# Patient Record
Sex: MALE | Race: WHITE | Employment: UNEMPLOYED | URBAN - METROPOLITAN AREA
[De-identification: names, ages, dates, MRNs, and addresses within clinical notes are randomized per-mention and may not be internally consistent; named-entity substitution may affect disease eponyms.]

---

## 2018-05-03 PROBLEM — G81.90 HEMIPLEGIA (HCC): Status: ACTIVE | Noted: 2018-05-03

## 2018-05-03 PROBLEM — R56.9 SEIZURE (HCC): Status: ACTIVE | Noted: 2018-05-03

## 2018-05-03 PROBLEM — A41.9 SEPSIS (HCC): Status: ACTIVE | Noted: 2018-05-03

## 2018-05-03 PROBLEM — Z89.519 HX OF BKA (HCC): Status: ACTIVE | Noted: 2018-05-03

## 2018-05-03 PROBLEM — I82.409 DVT (DEEP VENOUS THROMBOSIS) (HCC): Status: ACTIVE | Noted: 2018-05-03

## 2018-05-03 PROBLEM — I63.9 CVA (CEREBRAL VASCULAR ACCIDENT) (HCC): Status: ACTIVE | Noted: 2018-05-03

## 2018-05-07 PROBLEM — N39.0 UTI (URINARY TRACT INFECTION): Status: ACTIVE | Noted: 2018-05-07

## 2018-06-06 PROBLEM — N39.0 UTI (URINARY TRACT INFECTION): Status: RESOLVED | Noted: 2018-05-07 | Resolved: 2018-06-06

## 2025-01-20 ENCOUNTER — ANESTHESIA EVENT (OUTPATIENT)
Dept: OPERATING ROOM | Age: 65
End: 2025-01-20
Payer: MEDICARE

## 2025-01-20 ENCOUNTER — HOSPITAL ENCOUNTER (OUTPATIENT)
Age: 65
Setting detail: OUTPATIENT SURGERY
Discharge: HOME | End: 2025-01-20
Attending: UROLOGY | Admitting: UROLOGY
Payer: MEDICARE

## 2025-01-20 ENCOUNTER — APPOINTMENT (OUTPATIENT)
Dept: GENERAL RADIOLOGY | Age: 65
End: 2025-01-20
Attending: UROLOGY
Payer: MEDICARE

## 2025-01-20 ENCOUNTER — ANESTHESIA (OUTPATIENT)
Dept: OPERATING ROOM | Age: 65
End: 2025-01-20
Payer: MEDICARE

## 2025-01-20 VITALS
HEART RATE: 59 BPM | RESPIRATION RATE: 15 BRPM | SYSTOLIC BLOOD PRESSURE: 110 MMHG | OXYGEN SATURATION: 95 % | HEIGHT: 67 IN | DIASTOLIC BLOOD PRESSURE: 67 MMHG | WEIGHT: 190 LBS | TEMPERATURE: 97.7 F | BODY MASS INDEX: 29.82 KG/M2

## 2025-01-20 DIAGNOSIS — N20.0 CALCULUS OF KIDNEY: ICD-10-CM

## 2025-01-20 LAB
APTT BLD: 23.9 SEC (ref 22.1–36.4)
INR PPP: 0.99 (ref 0.85–1.15)
PROTHROMBIN TIME: 13.3 SEC (ref 11.9–14.9)

## 2025-01-20 PROCEDURE — 3600000004 HC SURGERY LEVEL 4 BASE: Performed by: UROLOGY

## 2025-01-20 PROCEDURE — 2580000003 HC RX 258: Performed by: NURSE ANESTHETIST, CERTIFIED REGISTERED

## 2025-01-20 PROCEDURE — 7100000010 HC PHASE II RECOVERY - FIRST 15 MIN: Performed by: UROLOGY

## 2025-01-20 PROCEDURE — 2500000003 HC RX 250 WO HCPCS: Performed by: UROLOGY

## 2025-01-20 PROCEDURE — 6360000002 HC RX W HCPCS: Performed by: NURSE ANESTHETIST, CERTIFIED REGISTERED

## 2025-01-20 PROCEDURE — 3600000014 HC SURGERY LEVEL 4 ADDTL 15MIN: Performed by: UROLOGY

## 2025-01-20 PROCEDURE — 3700000001 HC ADD 15 MINUTES (ANESTHESIA): Performed by: UROLOGY

## 2025-01-20 PROCEDURE — C1894 INTRO/SHEATH, NON-LASER: HCPCS | Performed by: UROLOGY

## 2025-01-20 PROCEDURE — 2580000003 HC RX 258: Performed by: ANESTHESIOLOGY

## 2025-01-20 PROCEDURE — C2617 STENT, NON-COR, TEM W/O DEL: HCPCS | Performed by: UROLOGY

## 2025-01-20 PROCEDURE — 6360000002 HC RX W HCPCS: Performed by: UROLOGY

## 2025-01-20 PROCEDURE — 88300 SURGICAL PATH GROSS: CPT

## 2025-01-20 PROCEDURE — 85610 PROTHROMBIN TIME: CPT

## 2025-01-20 PROCEDURE — 85730 THROMBOPLASTIN TIME PARTIAL: CPT

## 2025-01-20 PROCEDURE — C1747 HC ENDOSCOPE, SINGLE, URINARY TRACT: HCPCS | Performed by: UROLOGY

## 2025-01-20 PROCEDURE — 74420 UROGRAPHY RTRGR +-KUB: CPT

## 2025-01-20 PROCEDURE — 7100000000 HC PACU RECOVERY - FIRST 15 MIN: Performed by: UROLOGY

## 2025-01-20 PROCEDURE — 7100000011 HC PHASE II RECOVERY - ADDTL 15 MIN: Performed by: UROLOGY

## 2025-01-20 PROCEDURE — C1758 CATHETER, URETERAL: HCPCS | Performed by: UROLOGY

## 2025-01-20 PROCEDURE — 2709999900 HC NON-CHARGEABLE SUPPLY: Performed by: UROLOGY

## 2025-01-20 PROCEDURE — 2500000003 HC RX 250 WO HCPCS: Performed by: NURSE ANESTHETIST, CERTIFIED REGISTERED

## 2025-01-20 PROCEDURE — 7100000001 HC PACU RECOVERY - ADDTL 15 MIN: Performed by: UROLOGY

## 2025-01-20 PROCEDURE — C1769 GUIDE WIRE: HCPCS | Performed by: UROLOGY

## 2025-01-20 PROCEDURE — 3700000000 HC ANESTHESIA ATTENDED CARE: Performed by: UROLOGY

## 2025-01-20 PROCEDURE — 82365 CALCULUS SPECTROSCOPY: CPT

## 2025-01-20 PROCEDURE — 6360000004 HC RX CONTRAST MEDICATION: Performed by: UROLOGY

## 2025-01-20 DEVICE — URETERAL STENT
Type: IMPLANTABLE DEVICE | Site: URETER | Status: FUNCTIONAL
Brand: POLARIS™ ULTRA

## 2025-01-20 RX ORDER — FERROUS SULFATE 325(65) MG
325 TABLET ORAL
COMMUNITY

## 2025-01-20 RX ORDER — ROCURONIUM BROMIDE 10 MG/ML
INJECTION, SOLUTION INTRAVENOUS
Status: DISCONTINUED | OUTPATIENT
Start: 2025-01-20 | End: 2025-01-20 | Stop reason: SDUPTHER

## 2025-01-20 RX ORDER — NALOXONE HYDROCHLORIDE 0.4 MG/ML
INJECTION, SOLUTION INTRAMUSCULAR; INTRAVENOUS; SUBCUTANEOUS PRN
Status: DISCONTINUED | OUTPATIENT
Start: 2025-01-20 | End: 2025-01-20 | Stop reason: HOSPADM

## 2025-01-20 RX ORDER — LIDOCAINE HYDROCHLORIDE 20 MG/ML
INJECTION, SOLUTION INTRAVENOUS
Status: DISCONTINUED | OUTPATIENT
Start: 2025-01-20 | End: 2025-01-20 | Stop reason: SDUPTHER

## 2025-01-20 RX ORDER — METOCLOPRAMIDE HYDROCHLORIDE 5 MG/ML
10 INJECTION INTRAMUSCULAR; INTRAVENOUS
Status: DISCONTINUED | OUTPATIENT
Start: 2025-01-20 | End: 2025-01-20 | Stop reason: HOSPADM

## 2025-01-20 RX ORDER — FENTANYL CITRATE 50 UG/ML
INJECTION, SOLUTION INTRAMUSCULAR; INTRAVENOUS
Status: DISCONTINUED | OUTPATIENT
Start: 2025-01-20 | End: 2025-01-20 | Stop reason: SDUPTHER

## 2025-01-20 RX ORDER — DIPHENHYDRAMINE HYDROCHLORIDE 50 MG/ML
12.5 INJECTION INTRAMUSCULAR; INTRAVENOUS
Status: DISCONTINUED | OUTPATIENT
Start: 2025-01-20 | End: 2025-01-20 | Stop reason: HOSPADM

## 2025-01-20 RX ORDER — SODIUM CHLORIDE 9 MG/ML
INJECTION, SOLUTION INTRAVENOUS PRN
Status: DISCONTINUED | OUTPATIENT
Start: 2025-01-20 | End: 2025-01-20 | Stop reason: HOSPADM

## 2025-01-20 RX ORDER — MEPERIDINE HYDROCHLORIDE 25 MG/ML
12.5 INJECTION INTRAMUSCULAR; INTRAVENOUS; SUBCUTANEOUS EVERY 5 MIN PRN
Status: DISCONTINUED | OUTPATIENT
Start: 2025-01-20 | End: 2025-01-20 | Stop reason: HOSPADM

## 2025-01-20 RX ORDER — HYDRALAZINE HYDROCHLORIDE 20 MG/ML
10 INJECTION INTRAMUSCULAR; INTRAVENOUS
Status: DISCONTINUED | OUTPATIENT
Start: 2025-01-20 | End: 2025-01-20 | Stop reason: HOSPADM

## 2025-01-20 RX ORDER — PROPOFOL 10 MG/ML
INJECTION, EMULSION INTRAVENOUS
Status: DISCONTINUED | OUTPATIENT
Start: 2025-01-20 | End: 2025-01-20 | Stop reason: SDUPTHER

## 2025-01-20 RX ORDER — SODIUM CHLORIDE 0.9 % (FLUSH) 0.9 %
5-40 SYRINGE (ML) INJECTION PRN
Status: DISCONTINUED | OUTPATIENT
Start: 2025-01-20 | End: 2025-01-20 | Stop reason: HOSPADM

## 2025-01-20 RX ORDER — HYDROMORPHONE HYDROCHLORIDE 1 MG/ML
0.5 INJECTION, SOLUTION INTRAMUSCULAR; INTRAVENOUS; SUBCUTANEOUS EVERY 10 MIN PRN
Status: DISCONTINUED | OUTPATIENT
Start: 2025-01-20 | End: 2025-01-20 | Stop reason: HOSPADM

## 2025-01-20 RX ORDER — SODIUM CHLORIDE 0.9 % (FLUSH) 0.9 %
5-40 SYRINGE (ML) INJECTION EVERY 12 HOURS SCHEDULED
Status: DISCONTINUED | OUTPATIENT
Start: 2025-01-20 | End: 2025-01-20 | Stop reason: HOSPADM

## 2025-01-20 RX ORDER — MIDAZOLAM HYDROCHLORIDE 1 MG/ML
INJECTION, SOLUTION INTRAMUSCULAR; INTRAVENOUS
Status: DISCONTINUED | OUTPATIENT
Start: 2025-01-20 | End: 2025-01-20 | Stop reason: SDUPTHER

## 2025-01-20 RX ORDER — MAGNESIUM HYDROXIDE 1200 MG/15ML
LIQUID ORAL CONTINUOUS PRN
Status: DISCONTINUED | OUTPATIENT
Start: 2025-01-20 | End: 2025-01-20 | Stop reason: HOSPADM

## 2025-01-20 RX ORDER — LABETALOL HYDROCHLORIDE 5 MG/ML
10 INJECTION, SOLUTION INTRAVENOUS
Status: DISCONTINUED | OUTPATIENT
Start: 2025-01-20 | End: 2025-01-20 | Stop reason: HOSPADM

## 2025-01-20 RX ORDER — HYDROMORPHONE HYDROCHLORIDE 1 MG/ML
0.5 INJECTION, SOLUTION INTRAMUSCULAR; INTRAVENOUS; SUBCUTANEOUS EVERY 5 MIN PRN
Status: DISCONTINUED | OUTPATIENT
Start: 2025-01-20 | End: 2025-01-20 | Stop reason: HOSPADM

## 2025-01-20 RX ORDER — IOPAMIDOL 612 MG/ML
INJECTION, SOLUTION INTRAVASCULAR PRN
Status: DISCONTINUED | OUTPATIENT
Start: 2025-01-20 | End: 2025-01-20 | Stop reason: HOSPADM

## 2025-01-20 RX ORDER — ONDANSETRON 2 MG/ML
INJECTION INTRAMUSCULAR; INTRAVENOUS
Status: DISCONTINUED | OUTPATIENT
Start: 2025-01-20 | End: 2025-01-20 | Stop reason: SDUPTHER

## 2025-01-20 RX ORDER — SODIUM CHLORIDE, SODIUM LACTATE, POTASSIUM CHLORIDE, CALCIUM CHLORIDE 600; 310; 30; 20 MG/100ML; MG/100ML; MG/100ML; MG/100ML
INJECTION, SOLUTION INTRAVENOUS
Status: DISCONTINUED | OUTPATIENT
Start: 2025-01-20 | End: 2025-01-20 | Stop reason: SDUPTHER

## 2025-01-20 RX ORDER — CIPROFLOXACIN 2 MG/ML
400 INJECTION, SOLUTION INTRAVENOUS ONCE
Status: COMPLETED | OUTPATIENT
Start: 2025-01-20 | End: 2025-01-20

## 2025-01-20 RX ORDER — SODIUM CHLORIDE, SODIUM LACTATE, POTASSIUM CHLORIDE, CALCIUM CHLORIDE 600; 310; 30; 20 MG/100ML; MG/100ML; MG/100ML; MG/100ML
INJECTION, SOLUTION INTRAVENOUS CONTINUOUS
Status: DISCONTINUED | OUTPATIENT
Start: 2025-01-20 | End: 2025-01-20 | Stop reason: HOSPADM

## 2025-01-20 RX ADMIN — SODIUM CHLORIDE, POTASSIUM CHLORIDE, SODIUM LACTATE AND CALCIUM CHLORIDE: 600; 310; 30; 20 INJECTION, SOLUTION INTRAVENOUS at 07:54

## 2025-01-20 RX ADMIN — FENTANYL CITRATE 50 MCG: 50 INJECTION, SOLUTION INTRAMUSCULAR; INTRAVENOUS at 09:22

## 2025-01-20 RX ADMIN — MIDAZOLAM HYDROCHLORIDE 1 MG: 1 INJECTION, SOLUTION INTRAMUSCULAR; INTRAVENOUS at 08:35

## 2025-01-20 RX ADMIN — PHENYLEPHRINE HYDROCHLORIDE 100 MCG: 10 INJECTION, SOLUTION INTRAVENOUS at 08:42

## 2025-01-20 RX ADMIN — SODIUM CHLORIDE, SODIUM LACTATE, POTASSIUM CHLORIDE, AND CALCIUM CHLORIDE: .6; .31; .03; .02 INJECTION, SOLUTION INTRAVENOUS at 08:30

## 2025-01-20 RX ADMIN — CIPROFLOXACIN 400 MG: 400 INJECTION, SOLUTION INTRAVENOUS at 08:50

## 2025-01-20 RX ADMIN — MIDAZOLAM HYDROCHLORIDE 1 MG: 1 INJECTION, SOLUTION INTRAMUSCULAR; INTRAVENOUS at 08:29

## 2025-01-20 RX ADMIN — LIDOCAINE HYDROCHLORIDE 100 MG: 20 INJECTION, SOLUTION INTRAVENOUS at 08:35

## 2025-01-20 RX ADMIN — PROPOFOL 160 MG: 10 INJECTION, EMULSION INTRAVENOUS at 08:36

## 2025-01-20 RX ADMIN — PHENYLEPHRINE HYDROCHLORIDE 100 MCG: 10 INJECTION, SOLUTION INTRAVENOUS at 08:50

## 2025-01-20 RX ADMIN — SUGAMMADEX 200 MG: 100 INJECTION, SOLUTION INTRAVENOUS at 09:25

## 2025-01-20 RX ADMIN — ONDANSETRON 4 MG: 2 INJECTION INTRAMUSCULAR; INTRAVENOUS at 08:36

## 2025-01-20 RX ADMIN — ROCURONIUM BROMIDE 60 MG: 10 INJECTION, SOLUTION INTRAVENOUS at 08:37

## 2025-01-20 RX ADMIN — FENTANYL CITRATE 50 MCG: 50 INJECTION, SOLUTION INTRAMUSCULAR; INTRAVENOUS at 08:34

## 2025-01-20 ASSESSMENT — PAIN SCALES - GENERAL
PAINLEVEL_OUTOF10: 4
PAINLEVEL_OUTOF10: 0
PAINLEVEL_OUTOF10: 0

## 2025-01-20 ASSESSMENT — PAIN DESCRIPTION - DIRECTION: RADIATING_TOWARDS: YES

## 2025-01-20 ASSESSMENT — PAIN DESCRIPTION - FREQUENCY: FREQUENCY: CONTINUOUS

## 2025-01-20 ASSESSMENT — PAIN DESCRIPTION - ORIENTATION: ORIENTATION: RIGHT;LOWER

## 2025-01-20 ASSESSMENT — PAIN DESCRIPTION - LOCATION: LOCATION: ABDOMEN

## 2025-01-20 ASSESSMENT — PAIN - FUNCTIONAL ASSESSMENT: PAIN_FUNCTIONAL_ASSESSMENT: PREVENTS OR INTERFERES SOME ACTIVE ACTIVITIES AND ADLS

## 2025-01-20 ASSESSMENT — PAIN DESCRIPTION - PAIN TYPE: TYPE: ACUTE PAIN

## 2025-01-20 ASSESSMENT — PAIN DESCRIPTION - ONSET: ONSET: ON-GOING

## 2025-01-20 ASSESSMENT — PAIN DESCRIPTION - DESCRIPTORS: DESCRIPTORS: DISCOMFORT

## 2025-01-20 NOTE — OP NOTE
DATE OF SURGERY: 01/20/25  SURGEON: Leela Emery MD    PRE OP DIAGNOSIS: Right nephrolithiasis     POST OP DIAGNOSIS: Same    PROCEDURES PERFORMED:  1) Right ureteroscopy with laser lithotripsy, steerable stone vacuum extraction with CVAC and right ureteral stent placement     INDICATIONS FOR PROCEDURE: The patient is a 64 y.o. male with a right renal stone s/p right ESWL in July 2024 with retained stone.  He presents today for clearance ureteroscopy with CVAC.     DESCRIPTION OF PROCEDURE:  After informed consent the patient was taken to the operating room and placed under general anesthesia.  The patient was prepped and draped in the standard surgical fashion in the dorsal lithotomy position. A 21 Fr rigid cystoscope was placed per urethra into the bladder and the bladder was inspected and noted to be free of any suspicious lesions.  There is moderate trabeculations and some scattered cellules.  The existing right ureteral stent was grasped and brought to the urethral meatus without difficulty.  This was exchanged for a sensor wire.  The sensor wire was then exchanged for a 12-14 Slovenian 46 cm ureteral access sheath.  The inner cannula and the wire were removed.  The single use disposable CVAC ureteroscope was advanced up the access sheath and into the collecting system.  In the lower pole there was a tremendous amount of mostly stone debris.  I went into active suctioning and was able to remove the vast majority of this.  There were few free-floating fragments that were too large to remove.  Therefore, I obtained a 200 µm holmium laser fiber and performed a lithotripsy on the stones.  I then remove the laser fiber and regiment back into active suctioning.  All stones were cleared.  I then examined the entire collecting system without encountering additional stone.  I then injected contrast into the collecting system for identification purposes.  The scope and access sheath were then removed leaving a sensor

## 2025-01-20 NOTE — ANESTHESIA PRE PROCEDURE
Department of Anesthesiology  Preprocedure Note       Name:  Dwight Byers   Age:  64 y.o.  :  1960                                          MRN:  [id number]         Date:  2025      Surgeon: Surgeon(s):  Leela Emery MD    Procedure: Procedure(s):  CYSTOSCOPY, RIGHT FLEXIBLE URETEROSCOPY HOLMIUM LASER STONE MANIPULATION RIGHT STENT INSERTION WITH CALYXO    Medications prior to admission:   Prior to Admission medications    Medication Sig Start Date End Date Taking? Authorizing Provider   ferrous sulfate (IRON 325) 325 (65 Fe) MG tablet Take 1 tablet by mouth daily (with breakfast)   Yes ProviderRavi MD   omeprazole (PRILOSEC) 20 MG delayed release capsule Take 1 capsule by mouth daily   Yes ProviderRavi MD   vitamin D (ERGOCALCIFEROL) 1.25 MG (39924 UT) CAPS capsule Take 1 capsule by mouth once a week   Yes ProviderRavi MD   baclofen (LIORESAL) 10 MG tablet Take 1 tablet by mouth 2 times daily   Yes ProviderRavi MD   levETIRAcetam (KEPPRA) 500 MG tablet Take 2 tablets by mouth Daily   Yes ProviderRavi MD   metoprolol succinate (TOPROL XL) 25 MG extended release tablet Take 1 tablet by mouth daily   Yes ProviderRavi MD   pregabalin (LYRICA) 75 MG capsule Take 1 capsule by mouth 2 times daily.   Yes ProviderRavi MD   torsemide (DEMADEX) 10 MG tablet Take 1 tablet by mouth daily   Yes ProviderRavi MD   vitamin D (CHOLECALCIFEROL) 125 MCG (5000 UT) CAPS capsule Take 1 capsule by mouth daily   Yes ProviderRavi MD   atorvastatin (LIPITOR) 40 MG tablet Take 1 tablet by mouth daily   Yes ProviderRavi MD   acetaminophen (TYLENOL) 325 MG tablet Take 2 tablets by mouth every 6 hours as needed for Pain   Yes ProviderRavi MD   polyethylene glycol (GLYCOLAX) 17 GM/SCOOP powder Take 17 g by mouth as needed   Yes ProviderRavi MD   bisacodyl (DULCOLAX) 10 MG suppository Place 1 suppository rectally daily

## 2025-01-20 NOTE — DISCHARGE INSTRUCTIONS
-Drink a lot of water to keep urine clear    [facility] AMBULATORY PROCEDURE DISCHARGE INSTRUCTIONS    There are potential side effects of anesthesia or sedation you may experience for the first 24 hours.  These side effects include:    Confusion or Memory loss, Dizziness, or Delayed Reaction Times   [x]A responsible person should be with you for the next 24 hours.  Do not operate any vehicles (automobiles, bicycles, motorcycles) or power tools or machinery for 24 hours.  Do not sign any legal documents or make any legal decisions for 24 hours. Do not drink alcohol for 24 hours or while taking narcotic pain medication.      Nausea    [x]Start with light diet and progress to your normal diet as you feel like eating. However, if you experience nausea or repeated episodes of vomiting which persist beyond 12-24 hours, notify your physician.  Once nausea has passed, remember to keep drinking fluids.    Difficulty Passing Urine  [x]Drink extra amounts of fluid today.  Notify your physician if you have not urinated within 8 hours after your procedure or you feel uncomfortable.      Irritated Throat from a Breathing Tube  [x]Drink extra amounts of fluid today.  Lozenges may help.    Muscle Aches  [x]You may experience some generalized body aches as your muscles recover from medications used to relax them during surgery.  These will gradually subside.    MEDICATION INSTRUCTIONS:  [x]Prescription(S) x   0  sent with you.  Use as directed.  When taking pain medications, you may experience the side effect of dizziness or drowsiness.  Do not drink alcohol or drive when taking these medications.    [x]Give the list of your medications to your primary care physician on your next visit. Keep your med list updated and carry it with in case of emergencies.    [x] Narcotic pain medications can cause the side effect of significant constipation.  You may want to add a stool softener to your postoperative medication schedule or

## 2025-01-20 NOTE — PROGRESS NOTES
NURSING HOME / GROUP HOME SURGICAL/ PROCEDURE PATIENTS:  Facility Name: [facility]  Phone #: [phone]  Fax #: [phone]  Nurse spoke with: Shirley  Can patient sign for themselves?   Yes  Nurse Shirley confirms \"pt does not have POA papers\"  Is patient able to stand on own: No   Assistive devices needed: Amputee that Uses Wheelchair & Transfers Self  Incontinent: No   Skin issues? No   Transportation: \"In House\" [facility] [place]  \"Karen\" phone #: [phone]  Recent infection: No   Blood product refusal: No  Able to read / write: Yes  Behavior issues: No \"Shirley confirms pt has had a stroke & slow to respond @ X's\"  AICD & / or Pacemaker: No   Blood thinners- Last day patient to take per surgeon orders: Shirley confirms \"pt last dose of Eliquis is today 1/13/25 as instructed per surgeon & pt is not taking any other blood thinners or aspirin & will be faxing Med list, Dx list, H&P, labs, & any other other requested paperwork ASAP\". MD    Instructions:  Please fax patient's Med list, Dx list, and POA paper work ASAP  Please call for orders from surgeon or PCP re: diabetic medications / blood thinners if not already received  Meds to take day of surgery: Per Surgeon Instructions.  Please fax H&P, labs, & EKG as soon as completed  Instruct no food / drink 8 hours prior to arrival (except sip water with meds only)  Dress loose comfortable clothing.  No lotions, powders, nail polish, hairclips or jewelry  Send CPAP if uses  Shower am of surgery with antibacterial soap (or hibiclens, if ordered)  
1/14/2025 1205 PM:    SPOKE TO JANA (NURSE) AT [facility] TO REVIEW PREOP INSTRUCTIONS, INCLUDING CONTACT DR ALEGRIA OFFICE FOR ARRIVAL TIME AND ARRANGE TRANSPORT, PT TO BE NPO 8 HOURS PRIOR TO ARRIVAL, IF PT TAKING LYRICA, KEPPRA, TOPROL IN AM, TAKE DAY OF PROCEDURE WITHSIP OF WATER, INSTRUCTIONS FOR WHEN TO STOP/HOLD ALL BLOOD THINNERS PRIOR TO PROCEDURE-INCLUDES ASPIRIN, ELIQUIS, XARELTO, PLAVIX, COUMADIN COME FROM SURGEON OFFICE. PER JANA LAST DOSE OF ELIQUIS WAS 1/13/25 AND REQUEST MEDICATION LIST, ALLERGY LIST, DIAGNOSIS LIST.  FAXED PREOP INSTRUCTIONS -[phone]/TS    LMOR AT DR ALEGRIA FOR MILDRED TO CONTACT NURSING HOME WITH ARRIVAL TIME AND ANY SURGEON PREOP INSTRUCTIONS/TS    
Ambulatory Surgery/Procedure Discharge Note    Vitals:    01/20/25 1017   BP: 110/67   Pulse: 59   Resp: 15   Temp: 97.7 °F (36.5 °C)   SpO2: 95%   Per Tami score patient meets criteria for discharge.     In: 800 [I.V.:600]  Out: -     Restroom use offered before discharge.  Yes    Pain assessment:  none  Pain Level: 0    Pt discharged.  Discharge instructions reviewed with pt and STNA from [facility] (Karen).  Written handouts given to pt and caregiver.  Pt denies any needs at home and after discharge.  Pt's Caregiver here to take him home.  Escorted pt to front door in a wheelchair.       Patient discharged to home/self care. Patient discharged via wheel chair by transporter to waiting Caregiver.       1/20/2025 10:49am  
Arrived for surgery see consent.              H/P 1-14-25 needs up date  
Karen is waiting in waiting area for patient.   
PACU RN called report to receiving nurse Shirley at [facility].   
PACU Transfer to [facility]    Vitals:    01/20/25 1010   BP: 116/69   Pulse: 57   Resp: 11   Temp: 97.6 °F (36.4 °C)   SpO2: 97%         Intake/Output Summary (Last 24 hours) at 1/20/2025 1014  Last data filed at 1/20/2025 0938  Gross per 24 hour   Intake 800 ml   Output --   Net 800 ml       Pain assessment:  none  Pain Level: 0    Patient has all belongings at discharge from PACU.  PACU Rn called report to NURSE at [facility].     Patient transferred via STRETCHER per LIZZIE to care of [facility] RN.      
Patient arrived to PACU post CYSTOSCOPY, RIGHT FLEXIBLE URETEROSCOPY HOLMIUM LASER STONE MANIPULATION RIGHT STENT INSERTION WITH CALYXO - Right with Dr. Emery. LAUREN on arrival with Oral airway in place. CRNA gave PACU RN report at bedside stating no complications during procedure. Surgical site clean, dry and intact. Patient shows no signs of pain at this time. Will continue to monitor.   
ADVANCED DIRECTIVES -[phone].  CONTACT SURGEON OFFICE FOR ARRIVAL TIME AND ARRANGE TRANSPORT FOR PATIENT.        Thank you for allowing us to care for you.  We strive to exceed your expectations in the delivery of care and service provided to you and your family.     If you need to contact the Pre-Admission Testing staff for any reason, please call us at [phone]. PRE OP HISTORY AND PHYSICAL CAN BE FAXED -[phone].    Instructions reviewed with NURSE BATES AT NURSING HOME during preadmission testing phone interview AND EMAILED -[phone]  Roxanne Kennedy RN.1/14/2025 .11:58 AM       ADDITIONAL EDUCATIONAL INFORMATION REVIEWED PER PHONE WITH YOU AND/OR YOUR FAMILY:  No Hibiclens® Bathing Instructions   Yes Antibacterial Soap

## 2025-01-20 NOTE — ANESTHESIA POSTPROCEDURE EVALUATION
Department of Anesthesiology  Postprocedure Note    Patient: Dwight Byers  MRN: [id number]  YOB: 1960  Date of evaluation: 1/20/2025    Procedure Summary       Date: 01/20/25 Room / Location: [place] [address] / [facility]    Anesthesia Start: 0830 Anesthesia Stop: 0939    Procedure: CYSTOSCOPY, RIGHT FLEXIBLE URETEROSCOPY HOLMIUM LASER STONE MANIPULATION RIGHT STENT INSERTION WITH CALYXO (Right: Ureter) Diagnosis:       Calculus of kidney      (Calculus of kidney [N20.0])    Surgeons: Leela Emery MD Responsible Provider: Rigoberto Strickland MD    Anesthesia Type: general ASA Status: 3            Anesthesia Type: No value filed.    Tami Phase I: Tami Score: 10    Tami Phase II: Tami Score: 10    Anesthesia Post Evaluation    Patient location during evaluation: PACU  Patient participation: complete - patient participated  Level of consciousness: awake and alert  Pain score: 0  Airway patency: patent  Nausea & Vomiting: no nausea and no vomiting  Cardiovascular status: hemodynamically stable  Respiratory status: acceptable  Hydration status: euvolemic  Pain management: adequate    No notable events documented.

## 2025-01-20 NOTE — H&P
UROLOGY HISTORY AND PHYSICAL    Patient Name: Dwight Byers  : 1960  Age: 64 y.o.  Sex: male      History of Present Illness: 63yo M s/p failed right ESWL in 2024.  Has retained right ureteral stent as well as retained RLP renal stone.  Here for right uretero with CVAC        ALLERGY:  No Known Allergies    HOME MEDICATIONS:  Medications Prior to Admission: ferrous sulfate (IRON 325) 325 (65 Fe) MG tablet, Take 1 tablet by mouth daily (with breakfast)  omeprazole (PRILOSEC) 20 MG delayed release capsule, Take 1 capsule by mouth daily  vitamin D (ERGOCALCIFEROL) 1.25 MG (52249 UT) CAPS capsule, Take 1 capsule by mouth once a week  baclofen (LIORESAL) 10 MG tablet, Take 1 tablet by mouth 2 times daily  levETIRAcetam (KEPPRA) 500 MG tablet, Take 2 tablets by mouth Daily  metoprolol succinate (TOPROL XL) 25 MG extended release tablet, Take 1 tablet by mouth daily  pregabalin (LYRICA) 75 MG capsule, Take 1 capsule by mouth 2 times daily.  torsemide (DEMADEX) 10 MG tablet, Take 1 tablet by mouth daily  vitamin D (CHOLECALCIFEROL) 125 MCG (5000 UT) CAPS capsule, Take 1 capsule by mouth daily  atorvastatin (LIPITOR) 40 MG tablet, Take 1 tablet by mouth daily  acetaminophen (TYLENOL) 325 MG tablet, Take 2 tablets by mouth every 6 hours as needed for Pain  polyethylene glycol (GLYCOLAX) 17 GM/SCOOP powder, Take 17 g by mouth as needed  bisacodyl (DULCOLAX) 10 MG suppository, Place 1 suppository rectally daily as needed for Constipation  apixaban (ELIQUIS) 5 MG TABS tablet, Take 1 tablet by mouth 2 times daily       Past Medical History:   Past Medical History:   Diagnosis Date    Cerebral artery occlusion with cerebral infarction (HCC)     DVT (deep venous thrombosis) (HCC)     Hemiplegia (HCC)     left    Hx of blood clots     Hyperlipidemia     Kidney stones     Seizures (HCC)     TAKES DEPPRA AND LYRICA         Past Surgical History:   Past Surgical History:   Procedure Laterality Date    LEG AMPUTATION

## 2025-01-28 LAB
APPEARANCE STONE: NORMAL
COMPN STONE: NORMAL
SPECIMEN WT: 35 MG

## (undated) DEVICE — OPEN-END FLEXI-TIP URETERAL CATHETER: Brand: FLEXI-TIP

## (undated) DEVICE — URETEROSCOPE RIGID ASPIR SYS STRL DISP CVAC  MIN ORDER 2BX

## (undated) DEVICE — SEAL ENDOSCP INSTR BX PRT FOR ACC UPTO 3FR

## (undated) DEVICE — SINGLE ACTION PUMPING SYSTEM

## (undated) DEVICE — URETERAL ACCESS SHEATH SET: Brand: NAVIGATOR HD

## (undated) DEVICE — GUIDEWIRE UROLOGICAL STR 3 CM 0.038 INX150 CM NIT SENSOR

## (undated) DEVICE — CYSTO: Brand: MEDLINE INDUSTRIES, INC.

## (undated) DEVICE — SOLUTION IRRIG 3000ML 0.9% SOD CHL USP UROMATIC PLAS CONT

## (undated) DEVICE — GLOVE SURG SZ 65 THK91MIL LTX FREE SYN POLYISOPRENE

## (undated) DEVICE — TOWEL,STOP FLAG GOLD N-W: Brand: MEDLINE

## (undated) DEVICE — Device